# Patient Record
Sex: FEMALE | Race: OTHER | NOT HISPANIC OR LATINO
[De-identification: names, ages, dates, MRNs, and addresses within clinical notes are randomized per-mention and may not be internally consistent; named-entity substitution may affect disease eponyms.]

---
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
## 2023-01-09 ENCOUNTER — APPOINTMENT (OUTPATIENT)
Dept: ORTHOPEDIC SURGERY | Facility: CLINIC | Age: 18
End: 2023-01-09
Payer: COMMERCIAL

## 2023-01-09 VITALS — WEIGHT: 160 LBS | BODY MASS INDEX: 27.31 KG/M2 | HEIGHT: 64 IN

## 2023-01-09 DIAGNOSIS — S43.432A SUPERIOR GLENOID LABRUM LESION OF LEFT SHOULDER, INITIAL ENCOUNTER: ICD-10-CM

## 2023-01-09 DIAGNOSIS — S43.439A SUPERIOR GLENOID LABRUM LESION OF UNSPECIFIED SHOULDER, INITIAL ENCOUNTER: ICD-10-CM

## 2023-01-09 PROCEDURE — 99024 POSTOP FOLLOW-UP VISIT: CPT

## 2023-01-10 NOTE — HISTORY OF PRESENT ILLNESS
[de-identified] : Patient is here for a NF DOA 1/5/22 Post op of the left shoulder. 11/7/22 left shoulder SLAP repair. Pt notes an improvement in the left shoulder since last visit. Pt has been going to [facility] three times a week which has been helping the left shoulder. Pt notes having pain in the left shoulder when doing certain movements. ROM in left shoulder is improving.

## 2023-01-10 NOTE — DISCUSSION/SUMMARY
[de-identified] : s/p left shoulder labral repair 11.07.22 - normal course with good progress and no evidence of infection, continue rehab and appropriate nsaids as needed. She will be able to return to her normal job in one month, f/up in 1 month.

## 2023-01-17 ENCOUNTER — NON-APPOINTMENT (OUTPATIENT)
Age: 18
End: 2023-01-17

## 2023-02-21 ENCOUNTER — APPOINTMENT (OUTPATIENT)
Dept: ORTHOPEDIC SURGERY | Facility: CLINIC | Age: 18
End: 2023-02-21
Payer: COMMERCIAL

## 2023-02-21 VITALS — BODY MASS INDEX: 27.31 KG/M2 | HEIGHT: 64 IN | WEIGHT: 160 LBS

## 2023-02-21 PROCEDURE — 99213 OFFICE O/P EST LOW 20 MIN: CPT

## 2023-02-21 PROCEDURE — 99072 ADDL SUPL MATRL&STAF TM PHE: CPT

## 2023-02-22 NOTE — DISCUSSION/SUMMARY
[Surgical risks reviewed] : Surgical risks reviewed [de-identified] : The patient is approximately 4 months s/p left shoulder labral repai. Incision sites are well approximated, clean, dry, intact, without drainage, without erythema.  The patient's post-op plan, protocol and activity modifications have been thoroughly discussed and the patient expressed understanding. Continue physical therapy. Continue appropriate nsaids as needed. 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 Follow up 4 weeks.\par
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
## 2023-03-27 ENCOUNTER — APPOINTMENT (OUTPATIENT)
Dept: ORTHOPEDIC SURGERY | Facility: CLINIC | Age: 18
End: 2023-03-27
Payer: COMMERCIAL

## 2023-03-27 VITALS — HEIGHT: 64 IN | WEIGHT: 160 LBS | BODY MASS INDEX: 27.31 KG/M2

## 2023-03-27 DIAGNOSIS — Z78.9 OTHER SPECIFIED HEALTH STATUS: ICD-10-CM

## 2023-03-27 DIAGNOSIS — Z98.890 OTHER SPECIFIED POSTPROCEDURAL STATES: ICD-10-CM

## 2023-03-27 PROCEDURE — 99214 OFFICE O/P EST MOD 30 MIN: CPT

## 2023-03-28 PROBLEM — Z98.890 STATUS POST LABRAL REPAIR OF SHOULDER: Status: ACTIVE | Noted: 2022-11-14

## 2023-03-28 NOTE — PHYSICAL EXAM
[Left] : left shoulder [NL (0-180)] : full active abduction 0-180 degrees [NL (0-70)] : full internal rotation 0-70 degrees [NL (0-90)] : full external rotation 0-90 degrees [4 ___] : forward flexion 4[unfilled]/5 [4___] : external rotation 4[unfilled]/5 [] : motor and sensory intact distally

## 2023-03-28 NOTE — DISCUSSION/SUMMARY
[Medication Risks Reviewed] : Medication risks reviewed [de-identified] : improved still  needs therapy for weakness, continue another month\par Prescribed patient motrin 600mgs and discussed risks of side effects and timing and management of medication. Side effects include but are not limited to gi ulcers and irritation, as well as kidney failure and bleeding issues.\par
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
## 2023-09-29 ENCOUNTER — NON-APPOINTMENT (OUTPATIENT)
Age: 18
End: 2023-09-29